# Patient Record
Sex: MALE | Race: WHITE | NOT HISPANIC OR LATINO | Employment: OTHER | URBAN - NONMETROPOLITAN AREA
[De-identification: names, ages, dates, MRNs, and addresses within clinical notes are randomized per-mention and may not be internally consistent; named-entity substitution may affect disease eponyms.]

---

## 2022-12-30 ENCOUNTER — HOSPITAL ENCOUNTER (EMERGENCY)
Facility: HOSPITAL | Age: 77
Discharge: HOME | End: 2022-12-30
Payer: COMMERCIAL

## 2022-12-30 VITALS
BODY MASS INDEX: 34.46 KG/M2 | HEIGHT: 73 IN | TEMPERATURE: 98 F | DIASTOLIC BLOOD PRESSURE: 82 MMHG | RESPIRATION RATE: 18 BRPM | WEIGHT: 260 LBS | HEART RATE: 57 BPM | OXYGEN SATURATION: 97 % | SYSTOLIC BLOOD PRESSURE: 172 MMHG
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
## 2022-12-30 PROCEDURE — 90715 TDAP VACCINE 7 YRS/> IM: CPT | Performed by: REGISTERED NURSE

## 2022-12-30 PROCEDURE — 90471 IMMUNIZATION ADMIN: CPT | Performed by: REGISTERED NURSE

## 2022-12-30 PROCEDURE — 25000003 PHARM REV CODE 250: Performed by: REGISTERED NURSE

## 2022-12-30 PROCEDURE — 99284 EMERGENCY DEPT VISIT MOD MDM: CPT | Mod: GF,25 | Performed by: REGISTERED NURSE

## 2022-12-30 PROCEDURE — 99284 EMERGENCY DEPT VISIT MOD MDM: CPT | Mod: 25

## 2022-12-30 PROCEDURE — 63600175 PHARM REV CODE 636 W HCPCS: Performed by: REGISTERED NURSE

## 2022-12-30 RX ORDER — SULFAMETHOXAZOLE AND TRIMETHOPRIM 800; 160 MG/1; MG/1
1 TABLET ORAL 2 TIMES DAILY
Qty: 14 TABLET | Refills: 0 | Status: SHIPPED | OUTPATIENT
Start: 2022-12-30 | End: 2023-01-06

## 2022-12-30 RX ADMIN — BACITRACIN ZINC, NEOMYCIN SULFATE, POLYMYXIN B SULFATE: 3.5; 5000; 4 OINTMENT TOPICAL at 07:12

## 2022-12-30 RX ADMIN — TETANUS TOXOID, REDUCED DIPHTHERIA TOXOID AND ACELLULAR PERTUSSIS VACCINE, ADSORBED 0.5 ML: 5; 2.5; 8; 8; 2.5 SUSPENSION INTRAMUSCULAR at 06:12

## 2022-12-30 NOTE — ED TRIAGE NOTES
76 YO MALE TO ER WITH C/O OF HEAD INJURY THAT HAPPENED AROUND 9PM LAST NIGHT.  DENIES LOC.  HAS LACERATION TO RIGHT EAR AND ABRASION TO TOP OF HEAD.  NO ACTIVE BLEEDING.  NATASHA

## 2022-12-31 NOTE — ED PROVIDER NOTES
Encounter Date: 12/30/2022       History     Chief Complaint   Patient presents with    Head Injury    Fall     77 y-old  male received to ED with complaint of fall with injury to top of head and left ear. Patient states that the injury occurred at around 2100 on 12/29/2022. Patient presented to a local urgent care and was told to present to the ED for sutures for an injury that occurred almost 24 hours PTA. Patient is AAOX3, speech clear/appropriate, SINGH without any difficulty. Bilateral equal . Right eye with pupil equal and reactive to light. Right eye is abnormal s/p multiple injuries and corneal surgeries. Patient states that he is scheduled for a corneal transplant in 2023. Denies any LOC.     Review of patient's allergies indicates:  No Known Allergies  Past Medical History:   Diagnosis Date    Prostate cancer      Past Surgical History:   Procedure Laterality Date    XI ROBOTIC PROSTECTOMY, SUPRAPUBIC       History reviewed. No pertinent family history.  Social History     Tobacco Use    Smoking status: Never    Smokeless tobacco: Never   Substance Use Topics    Alcohol use: Never    Drug use: Never     Review of Systems   Constitutional: Negative.    HENT: Negative.     Eyes: Negative.    Respiratory: Negative.     Cardiovascular: Negative.    Gastrointestinal: Negative.    Endocrine: Negative.    Genitourinary: Negative.    Musculoskeletal: Negative.    Skin: Negative.    Allergic/Immunologic: Negative.    Neurological: Negative.    Hematological: Negative.    Psychiatric/Behavioral: Negative.       Physical Exam     Initial Vitals [12/30/22 1743]   BP Pulse Resp Temp SpO2   (!) 172/82 (!) 57 18 97.9 °F (36.6 °C) 97 %      MAP       --         Physical Exam    Constitutional: He appears well-developed and well-nourished. He is not diaphoretic. No distress.   HENT:   Head: Normocephalic and atraumatic.   Left Ear: External ear normal.   Nose: Nose normal.   Mouth/Throat: Oropharynx is clear  and moist. No oropharyngeal exudate.   Right ear is slightly swollen with bruising noted to auricular area of right ear. No s/s of hematoma and tae, helix, antihelix, and and tae clearly visible without distortion..    Eyes: EOM are normal. Right eye exhibits no discharge. Left eye exhibits no discharge. No scleral icterus.   Left eye normal. Right eye us s/p multiple injuries with multiple corneal surgeries. Patient states no change.    Neck: Neck supple.   Normal range of motion.  Cardiovascular:  Normal rate, regular rhythm, normal heart sounds and intact distal pulses.           Pulmonary/Chest: Breath sounds normal.   Abdominal: Abdomen is soft. Bowel sounds are normal.   Musculoskeletal:         General: Normal range of motion.      Cervical back: Normal range of motion and neck supple.     Neurological: He is alert and oriented to person, place, and time. He has normal strength. GCS score is 15. GCS eye subscore is 4. GCS verbal subscore is 5. GCS motor subscore is 6.   Skin: Skin is warm and dry. Capillary refill takes less than 2 seconds.   Psychiatric: He has a normal mood and affect. His behavior is normal. Judgment and thought content normal.       Medical Screening Exam   See Full Note    ED Course   Procedures  Labs Reviewed - No data to display       Imaging Results    None          Medications   Tdap (BOOSTRIX) vaccine injection 0.5 mL (0.5 mLs Intramuscular Given 12/30/22 1858)   neomycin-bacitracnZn-polymyxnB packet ( Topical (Top) Given 12/30/22 1900)     Medical Decision Making:   ED Management:  Patients injury is >20 hours old. No LOC. No s/s of hematoma to auricular area, but ear is swollen. I discussed at length with patient that is his swelling/pain increased that he may need surgical evacuation if hematoma develops. I also discussed treatment with Bactrim twice daily since drainage from small posterior lobe is serous. Patient verbalizes understanding and agrees with plan.                   Clinical Impression:   Final diagnoses:  [W19.XXXA] Fall, initial encounter (Primary)               DAYNA Andrews  12/30/22 190     Hemostasis: Electrocautery

## 2023-01-03 ENCOUNTER — TELEPHONE (OUTPATIENT)
Dept: EMERGENCY MEDICINE | Facility: HOSPITAL | Age: 78
End: 2023-01-03
Payer: COMMERCIAL

## 2023-09-14 ENCOUNTER — OFFICE VISIT (OUTPATIENT)
Dept: OTOLARYNGOLOGY | Facility: CLINIC | Age: 78
End: 2023-09-14
Payer: COMMERCIAL

## 2023-09-14 VITALS — BODY MASS INDEX: 34.46 KG/M2 | HEIGHT: 73 IN | WEIGHT: 260 LBS

## 2023-09-14 DIAGNOSIS — H60.63 CHRONIC OTITIS EXTERNA OF BOTH EARS, UNSPECIFIED TYPE: Primary | ICD-10-CM

## 2023-09-14 DIAGNOSIS — H90.3 SENSORINEURAL HEARING LOSS (SNHL) OF BOTH EARS: ICD-10-CM

## 2023-09-14 DIAGNOSIS — H65.23 CHRONIC SEROUS OTITIS MEDIA OF BOTH EARS: ICD-10-CM

## 2023-09-14 DIAGNOSIS — T16.1XXA FOREIGN BODY OF RIGHT EAR, INITIAL ENCOUNTER: ICD-10-CM

## 2023-09-14 PROCEDURE — 99204 OFFICE O/P NEW MOD 45 MIN: CPT | Mod: 25,S$PBB,, | Performed by: OTOLARYNGOLOGY

## 2023-09-14 PROCEDURE — 99213 OFFICE O/P EST LOW 20 MIN: CPT | Mod: PBBFAC | Performed by: OTOLARYNGOLOGY

## 2023-09-14 PROCEDURE — 69200 PR REMV EXT CANAL FOREIGN BODY: ICD-10-PCS | Mod: S$PBB,RT,, | Performed by: OTOLARYNGOLOGY

## 2023-09-14 PROCEDURE — 1159F MED LIST DOCD IN RCRD: CPT | Mod: CPTII,,, | Performed by: OTOLARYNGOLOGY

## 2023-09-14 PROCEDURE — 99204 PR OFFICE/OUTPT VISIT, NEW, LEVL IV, 45-59 MIN: ICD-10-PCS | Mod: 25,S$PBB,, | Performed by: OTOLARYNGOLOGY

## 2023-09-14 PROCEDURE — 1160F PR REVIEW ALL MEDS BY PRESCRIBER/CLIN PHARMACIST DOCUMENTED: ICD-10-PCS | Mod: CPTII,,, | Performed by: OTOLARYNGOLOGY

## 2023-09-14 PROCEDURE — 69200 CLEAR OUTER EAR CANAL: CPT | Mod: PBBFAC,RT | Performed by: OTOLARYNGOLOGY

## 2023-09-14 PROCEDURE — 69200 CLEAR OUTER EAR CANAL: CPT | Mod: S$PBB,RT,, | Performed by: OTOLARYNGOLOGY

## 2023-09-14 PROCEDURE — 1160F RVW MEDS BY RX/DR IN RCRD: CPT | Mod: CPTII,,, | Performed by: OTOLARYNGOLOGY

## 2023-09-14 PROCEDURE — 1159F PR MEDICATION LIST DOCUMENTED IN MEDICAL RECORD: ICD-10-PCS | Mod: CPTII,,, | Performed by: OTOLARYNGOLOGY

## 2023-09-14 RX ORDER — NEOMYCIN SULFATE, POLYMYXIN B SULFATE AND HYDROCORTISONE 10; 3.5; 1 MG/ML; MG/ML; [USP'U]/ML
3 SUSPENSION/ DROPS AURICULAR (OTIC) 2 TIMES DAILY
Qty: 10 ML | Refills: 0 | Status: SHIPPED | OUTPATIENT
Start: 2023-09-14

## 2023-09-14 NOTE — PROGRESS NOTES
Subjective:       Patient ID: Theodore Robert is a 78 y.o. male.    Chief Complaint: Hearing Loss, roaring in ears, and Tinnitus (Patient complaining of roaring in his ears and ringing in his head. States he wears hearing aids.)    HPI  Review of Systems   HENT:  Positive for ear discharge, ear pain and tinnitus.    All other systems reviewed and are negative.      Objective:      Physical Exam  General: NAD  Head: Normocephalic, atraumatic, no facial asymmetry/normal strength,  Ears: Both auricules normal in appearance, w/o deformities tympanic membranes retracted dull external auditory canals FB right   Nose: External nose w/o deformities normal turbinates no drainage or inflammation  Oral Cavity: Lips, gums, floor of mouth, tongue hard palate, and buccal mucosa without mass/lesion  Oropharynx: Mucosa pink and moist, soft palate, posterior pharynx and oropharyngeal wall without mass/lesion  Neck: Supple, symmetric, trachea midline, no palpable mass/lesion, no palpable cervical lymphadenopathy  Skin: Warm and dry, no concerning lesions  Respiratory: Respirations even, unlabored     Procedure: Binocular microscopy with removal of FB using microsurgical instrumentation.  After explaining the procedure and obtaining verbal assent, the right external auditory canal visualized with the 250 mm focal length lens through the operating microscope. The FB was removed with microsurgical instrumentation to reveal normal and healthy external auditory canals. The patient tolerated this procedure well without complication.     Assessment:       1. Chronic otitis externa of both ears, unspecified type    2. Foreign body of right ear, initial encounter    3. Sensorineural hearing loss (SNHL) of both ears    4. Chronic serous otitis media of both ears        Plan:       CSP for canals